# Patient Record
Sex: MALE | Race: WHITE | NOT HISPANIC OR LATINO | URBAN - METROPOLITAN AREA
[De-identification: names, ages, dates, MRNs, and addresses within clinical notes are randomized per-mention and may not be internally consistent; named-entity substitution may affect disease eponyms.]

---

## 2024-10-28 ENCOUNTER — OFFICE VISIT (OUTPATIENT)
Dept: URGENT CARE | Age: 44
End: 2024-10-28
Payer: COMMERCIAL

## 2024-10-28 ENCOUNTER — ANCILLARY PROCEDURE (OUTPATIENT)
Dept: URGENT CARE | Age: 44
End: 2024-10-28
Payer: COMMERCIAL

## 2024-10-28 VITALS
SYSTOLIC BLOOD PRESSURE: 149 MMHG | RESPIRATION RATE: 18 BRPM | OXYGEN SATURATION: 95 % | DIASTOLIC BLOOD PRESSURE: 92 MMHG | HEIGHT: 71 IN | TEMPERATURE: 98.1 F | BODY MASS INDEX: 24.22 KG/M2 | HEART RATE: 65 BPM | WEIGHT: 173 LBS

## 2024-10-28 DIAGNOSIS — M25.571 ACUTE RIGHT ANKLE PAIN: Primary | ICD-10-CM

## 2024-10-28 DIAGNOSIS — M25.571 ACUTE RIGHT ANKLE PAIN: ICD-10-CM

## 2024-10-28 PROCEDURE — 73610 X-RAY EXAM OF ANKLE: CPT | Mod: RIGHT SIDE

## 2024-10-28 ASSESSMENT — ENCOUNTER SYMPTOMS: ARTHRALGIAS: 1

## 2025-01-25 ENCOUNTER — OFFICE VISIT (OUTPATIENT)
Dept: URGENT CARE | Age: 45
End: 2025-01-25
Payer: COMMERCIAL

## 2025-01-25 VITALS
TEMPERATURE: 98.4 F | DIASTOLIC BLOOD PRESSURE: 91 MMHG | SYSTOLIC BLOOD PRESSURE: 134 MMHG | HEART RATE: 73 BPM | OXYGEN SATURATION: 99 %

## 2025-01-25 DIAGNOSIS — R53.83 FATIGUE, UNSPECIFIED TYPE: ICD-10-CM

## 2025-01-25 DIAGNOSIS — J10.1 INFLUENZA A: Primary | ICD-10-CM

## 2025-01-25 LAB
POC BINAX EXPIRATION: 0
POC BINAX NOW COVID SERIAL NUMBER: 0
POC RAPID INFLUENZA A: POSITIVE
POC RAPID INFLUENZA B: NEGATIVE
POC SARS-COV-2 AG BINAX: NORMAL

## 2025-01-25 RX ORDER — OSELTAMIVIR PHOSPHATE 75 MG/1
75 CAPSULE ORAL 2 TIMES DAILY
Qty: 10 CAPSULE | Refills: 0 | Status: SHIPPED | OUTPATIENT
Start: 2025-01-25 | End: 2025-01-30

## 2025-01-25 RX ORDER — OMEPRAZOLE 40 MG/1
40 CAPSULE, DELAYED RELEASE ORAL
COMMUNITY

## 2025-01-25 ASSESSMENT — ENCOUNTER SYMPTOMS: FATIGUE: 1

## 2025-01-26 NOTE — PROGRESS NOTES
Subjective   Patient ID: Sarthak Hong is a 44 y.o. male. They present today with a chief complaint of Fatigue (Pt states today fatigue and body aches ).    History of Present Illness  Patient is a 44-year-old male with no reported past medical history who presents urgent care today with flulike symptoms.  He states his symptoms started today.  Specifically he endorses fatigue and bodyaches.  He denies chest pain or shortness of breath.  No other complaints or concerns mention at this time.      History provided by:  Patient  Fatigue  Associated symptoms: fatigue        Past Medical History  Allergies as of 01/25/2025 - Reviewed 01/25/2025   Allergen Reaction Noted    Tessalon [benzonatate] Hives 01/25/2025       (Not in a hospital admission)         No past medical history on file.    No past surgical history on file.         Review of Systems  Review of Systems   Constitutional:  Positive for fatigue.   All other systems reviewed and are negative.                                 Objective    Vitals:    01/25/25 1951   BP: (!) 134/91   Pulse: 73   Temp: 36.9 °C (98.4 °F)   SpO2: 99%     No LMP for male patient.    Physical Exam  Vitals and nursing note reviewed.   Constitutional:       General: He is not in acute distress.     Appearance: Normal appearance. He is not ill-appearing, toxic-appearing or diaphoretic.   HENT:      Head: Normocephalic and atraumatic.      Nose: Nose normal.      Mouth/Throat:      Mouth: Mucous membranes are moist.   Eyes:      Extraocular Movements: Extraocular movements intact.      Conjunctiva/sclera: Conjunctivae normal.      Pupils: Pupils are equal, round, and reactive to light.   Cardiovascular:      Rate and Rhythm: Normal rate and regular rhythm.      Pulses: Normal pulses.      Heart sounds: Normal heart sounds.   Pulmonary:      Effort: Pulmonary effort is normal. No respiratory distress.      Breath sounds: Normal breath sounds. No stridor. No wheezing, rhonchi or rales.    Chest:      Chest wall: No tenderness.   Musculoskeletal:         General: Normal range of motion.      Cervical back: Normal range of motion and neck supple.   Skin:     General: Skin is warm and dry.      Capillary Refill: Capillary refill takes less than 2 seconds.   Neurological:      General: No focal deficit present.      Mental Status: He is alert and oriented to person, place, and time.   Psychiatric:         Mood and Affect: Mood normal.         Behavior: Behavior normal.         Procedures      Assessment/Plan   Allergies, medications, history, and pertinent labs/EKGs/Imaging reviewed by ADRIAN Cardoza.     Medical Decision Making    Patient is well appearing, afebrile, non toxic, not hypoxic, and appropriate for outpatient treatment and management at time of evaluation. Patient presents with flulike symptoms for 1 day.     Differential includes but not limited to: COVID, influenza, URI, other    Physical exam as described above.  Lung sounds are clear in all fields bilaterally.  Vitals are within normal limits.    Rapid COVID is negative.  Rapid flu is positive for influenza A.    I discussed these findings with the patient.  He was provided with a prescription for Tamiflu to use as directed.  Also recommended continued use of over-the-counter medication as needed for symptom relief and close follow-up with PCP.  ER precautions and red flags discussed.  He voices understanding and is agreeable to this plan.  He was discharged in stable condition.  All questions and concerns addressed.           Orders and Diagnoses  Diagnoses and all orders for this visit:  Fatigue, unspecified type  -     POCT Covid-19 Rapid Antigen  -     POCT Influenza A/B manually resulted      Medical Admin Record      Follow Up Instructions  No follow-ups on file.    Patient disposition: Home    Electronically signed by ADRIAN Cardoza  7:58 PM

## 2025-01-26 NOTE — PATIENT INSTRUCTIONS
You were seen at Urgent Care today and diagnosed with influenza A. Please treat as discussed. Please take medications as prescribed. Monitor for red flags which we spoke about, If your symptoms change, worsen or become concerning in any way, please go to the emergency room immediately, otherwise you can followup with your PCP in 2-3 days as needed    During emergency room stay, your blood pressure was found to be elevated.  High blood pressure is not often diagnosed in the ED due to multiple factors contributing to the reading.  Your elevated reading could be due to undiagnosed high blood pressure.  I recommend you get your blood pressure rechecked with your primary care provider this week.  Please call to schedule this.